# Patient Record
Sex: MALE | URBAN - METROPOLITAN AREA
[De-identification: names, ages, dates, MRNs, and addresses within clinical notes are randomized per-mention and may not be internally consistent; named-entity substitution may affect disease eponyms.]

---

## 2022-08-18 ENCOUNTER — OFFICE VISIT (OUTPATIENT)
Dept: URBAN - METROPOLITAN AREA CLINIC 8 | Facility: CLINIC | Age: 72
End: 2022-08-18
Payer: COMMERCIAL

## 2022-08-18 DIAGNOSIS — H02.839 DERMATOCHALASIS OF EYELID: ICD-10-CM

## 2022-08-18 DIAGNOSIS — H25.13 AGE-RELATED NUCLEAR CATARACT, BILATERAL: Primary | ICD-10-CM

## 2022-08-18 PROCEDURE — 99204 OFFICE O/P NEW MOD 45 MIN: CPT | Performed by: OPHTHALMOLOGY

## 2022-08-18 PROCEDURE — 92136 OPHTHALMIC BIOMETRY: CPT | Performed by: OPHTHALMOLOGY

## 2022-08-18 ASSESSMENT — VISUAL ACUITY
OS: 20/40
OD: 20/40

## 2022-08-18 ASSESSMENT — INTRAOCULAR PRESSURE
OD: 13
OS: 12

## 2022-08-18 ASSESSMENT — PACHYMETRY
OD: 24.49
OD: 3.26
OS: 3.32
OS: 24.54

## 2022-08-18 NOTE — IMPRESSION/PLAN
Impression: Age-related nuclear cataract, bilateral: H25.13. Plan: Patient desires standard cataract surgery with a standard IOL. The patient understands they will need eyeglasses for some or all activities. The risks, benefits and alternatives to surgery were discussed with the patient at length. Patient elects to proceed with surgery left eye first, standard, target plano Candidate for Relacs/Ora IOL master, Verion, Shahbaz and OCT were completed today. Cataract video viewed today. Reviewed surgery drops.

## 2022-09-13 ENCOUNTER — POST-OPERATIVE VISIT (OUTPATIENT)
Dept: URBAN - METROPOLITAN AREA CLINIC 8 | Facility: CLINIC | Age: 72
End: 2022-09-13
Payer: COMMERCIAL

## 2022-09-13 DIAGNOSIS — Z48.810 ENCOUNTER FOR SURGICAL AFTERCARE FOLLOWING SURGERY ON A SENSE ORGAN: Primary | ICD-10-CM

## 2022-09-13 PROCEDURE — 99024 POSTOP FOLLOW-UP VISIT: CPT | Performed by: OPHTHALMOLOGY

## 2022-09-13 ASSESSMENT — INTRAOCULAR PRESSURE: OS: 19

## 2022-09-13 NOTE — IMPRESSION/PLAN
Impression: S/P [facility] - PC IOL OS - 1 Day. Encounter for surgical aftercare following surgery on a sense organ  Z48.810.  Post operative instructions reviewed - Condition is improving - Plan: --Continue Ofloxacin 0.3%, Pred1 % as directed

## 2022-09-20 ENCOUNTER — POST-OPERATIVE VISIT (OUTPATIENT)
Dept: URBAN - METROPOLITAN AREA CLINIC 8 | Facility: CLINIC | Age: 72
End: 2022-09-20
Payer: COMMERCIAL

## 2022-09-20 DIAGNOSIS — Z48.810 ENCOUNTER FOR SURGICAL AFTERCARE FOLLOWING SURGERY ON A SENSE ORGAN: ICD-10-CM

## 2022-09-20 DIAGNOSIS — H25.11 AGE-RELATED NUCLEAR CATARACT, RIGHT EYE: Primary | ICD-10-CM

## 2022-09-20 ASSESSMENT — VISUAL ACUITY
OS: 20/25
OD: 20/40

## 2022-09-20 ASSESSMENT — INTRAOCULAR PRESSURE: OS: 16

## 2022-09-20 NOTE — IMPRESSION/PLAN
Impression: Age-related nuclear cataract, right eye: H25.11. Plan: Okay to proceed with 2nd eye cataract sx right eye with LenSx/ORA. Target PLANO. Risks and benefits of surgical treatment were discussed and understood. 
drops oculofox and pred forte

## 2022-09-20 NOTE — IMPRESSION/PLAN
Impression: S/P [facility] - PC IOL OS - 8 Days. Encounter for surgical aftercare following surgery on a sense organ  Z48.810.  Plan: Post operative instructions reviewed - Condition is improving -  schedule 2nd eye --Continue Prednisolone acetate 1%

## 2022-10-03 ENCOUNTER — Encounter (OUTPATIENT)
Dept: URBAN - METROPOLITAN AREA EXTERNAL CLINIC 14 | Facility: EXTERNAL CLINIC | Age: 72
End: 2022-10-03
Payer: COMMERCIAL

## 2022-10-03 PROCEDURE — 66984 XCAPSL CTRC RMVL W/O ECP: CPT | Performed by: OPHTHALMOLOGY

## 2022-10-04 ENCOUNTER — POST-OPERATIVE VISIT (OUTPATIENT)
Dept: URBAN - METROPOLITAN AREA CLINIC 42 | Facility: CLINIC | Age: 72
End: 2022-10-04
Payer: COMMERCIAL

## 2022-10-04 DIAGNOSIS — Z96.1 PRESENCE OF INTRAOCULAR LENS: Primary | ICD-10-CM

## 2022-10-04 PROCEDURE — 99024 POSTOP FOLLOW-UP VISIT: CPT | Performed by: OPHTHALMOLOGY

## 2022-10-04 ASSESSMENT — INTRAOCULAR PRESSURE: OD: 15

## 2022-10-04 NOTE — IMPRESSION/PLAN
Impression: S/P [facility] - PC IOL +18.5 SN60WF OD - 1 Day. Presence of intraocular lens  Z96.1.  Excellent post op course   Post operative instructions reviewed - Condition is improving - Plan: --Continue Prednisolone acetate 1% and Ofloxacin as directed

## 2022-10-13 ENCOUNTER — POST-OPERATIVE VISIT (OUTPATIENT)
Dept: URBAN - METROPOLITAN AREA CLINIC 42 | Facility: CLINIC | Age: 72
End: 2022-10-13
Payer: COMMERCIAL

## 2022-10-13 DIAGNOSIS — Z96.1 PRESENCE OF INTRAOCULAR LENS: Primary | ICD-10-CM

## 2022-10-13 PROCEDURE — 99024 POSTOP FOLLOW-UP VISIT: CPT | Performed by: OPHTHALMOLOGY

## 2022-10-13 ASSESSMENT — INTRAOCULAR PRESSURE: OD: 11

## 2022-10-13 ASSESSMENT — VISUAL ACUITY: OD: 20/25

## 2022-10-13 NOTE — IMPRESSION/PLAN
Impression: S/P [facility] - PC IOL +18.5 SN60WF OD - 10 Days. Presence of intraocular lens  Z96.1. Post operative instructions reviewed - Condition is improving - Plan: Referred patient to [facility] if patient desires glasses rx. Return to clinic PRN. --Continue Prednisolone acetate 1% as directed.